# Patient Record
Sex: FEMALE | URBAN - METROPOLITAN AREA
[De-identification: names, ages, dates, MRNs, and addresses within clinical notes are randomized per-mention and may not be internally consistent; named-entity substitution may affect disease eponyms.]

---

## 2019-08-19 ENCOUNTER — IMPORTED ENCOUNTER (OUTPATIENT)
Dept: URBAN - METROPOLITAN AREA CLINIC 1 | Facility: CLINIC | Age: 39
End: 2019-08-19

## 2019-08-19 PROBLEM — S05.02XA: Noted: 2019-08-19

## 2019-08-19 PROCEDURE — 92002 INTRM OPH EXAM NEW PATIENT: CPT

## 2019-08-19 NOTE — PATIENT DISCUSSION
1.  Corneal Abrasion OS (Active) : Begin Tobradex TID OS x 5 days (erx). Recommend PF ATs Q1H OS. Return for an appointment in [place] with Dr. Odette Diaz.

## 2022-01-17 NOTE — PATIENT DISCUSSION
+FAMILY HISTORY BUT NORMAL IOP AND NORMAL C/D. THICK PACHY. RNFL and VF reviewed with pt. Gonio done today.

## 2022-04-02 ASSESSMENT — TONOMETRY
OD_IOP_MMHG: 14
OS_IOP_MMHG: 14

## 2022-04-02 ASSESSMENT — VISUAL ACUITY
OS_CC: 20/20
OD_CC: 20/20
OD_SC: J1+
OS_SC: J1+